# Patient Record
Sex: FEMALE | Race: BLACK OR AFRICAN AMERICAN | Employment: FULL TIME | URBAN - METROPOLITAN AREA
[De-identification: names, ages, dates, MRNs, and addresses within clinical notes are randomized per-mention and may not be internally consistent; named-entity substitution may affect disease eponyms.]

---

## 2018-09-29 ENCOUNTER — HOSPITAL ENCOUNTER (EMERGENCY)
Age: 43
Discharge: HOME | End: 2018-09-29
Attending: EMERGENCY MEDICINE | Admitting: EMERGENCY MEDICINE
Payer: COMMERCIAL

## 2018-09-29 ENCOUNTER — APPOINTMENT (OUTPATIENT)
Dept: GENERAL RADIOLOGY | Age: 43
End: 2018-09-29
Attending: PHYSICIAN ASSISTANT
Payer: COMMERCIAL

## 2018-09-29 VITALS
HEIGHT: 67 IN | OXYGEN SATURATION: 99 % | HEART RATE: 94 BPM | TEMPERATURE: 97.8 F | WEIGHT: 250 LBS | BODY MASS INDEX: 39.24 KG/M2 | RESPIRATION RATE: 18 BRPM | SYSTOLIC BLOOD PRESSURE: 128 MMHG | DIASTOLIC BLOOD PRESSURE: 77 MMHG

## 2018-09-29 DIAGNOSIS — M25.511 PAIN IN JOINT OF RIGHT SHOULDER: Primary | ICD-10-CM

## 2018-09-29 PROCEDURE — 74011250637 HC RX REV CODE- 250/637: Performed by: PHYSICIAN ASSISTANT

## 2018-09-29 PROCEDURE — 99283 EMERGENCY DEPT VISIT LOW MDM: CPT

## 2018-09-29 PROCEDURE — 74011636637 HC RX REV CODE- 636/637: Performed by: PHYSICIAN ASSISTANT

## 2018-09-29 PROCEDURE — 73030 X-RAY EXAM OF SHOULDER: CPT

## 2018-09-29 PROCEDURE — 72050 X-RAY EXAM NECK SPINE 4/5VWS: CPT

## 2018-09-29 RX ORDER — NAPROXEN 500 MG/1
500 TABLET ORAL 2 TIMES DAILY WITH MEALS
Qty: 20 TAB | Refills: 0 | Status: SHIPPED | OUTPATIENT
Start: 2018-09-29 | End: 2019-10-13

## 2018-09-29 RX ORDER — HYDROCODONE BITARTRATE AND ACETAMINOPHEN 5; 325 MG/1; MG/1
1 TABLET ORAL
Qty: 6 TAB | Refills: 0 | Status: SHIPPED | OUTPATIENT
Start: 2018-09-29 | End: 2019-10-13

## 2018-09-29 RX ORDER — PREDNISONE 10 MG/1
TABLET ORAL
Qty: 48 TAB | Refills: 0 | Status: SHIPPED | OUTPATIENT
Start: 2018-09-29 | End: 2019-10-13

## 2018-09-29 RX ORDER — NAPROXEN 250 MG/1
500 TABLET ORAL
Status: COMPLETED | OUTPATIENT
Start: 2018-09-29 | End: 2018-09-29

## 2018-09-29 RX ORDER — LEVOTHYROXINE SODIUM 50 UG/1
TABLET ORAL
COMMUNITY
End: 2019-10-13

## 2018-09-29 RX ORDER — PREDNISONE 20 MG/1
60 TABLET ORAL
Status: COMPLETED | OUTPATIENT
Start: 2018-09-29 | End: 2018-09-29

## 2018-09-29 RX ADMIN — PREDNISONE 60 MG: 20 TABLET ORAL at 18:15

## 2018-09-29 RX ADMIN — NAPROXEN 500 MG: 250 TABLET ORAL at 18:15

## 2018-09-29 NOTE — LETTER
[facility] EMERGENCY DEPT 
[address] [facility] [address] [address] 
[phone] Work/School Note Date: 9/29/2018 To Whom It May concern: 
 
Monica Gastelum was seen and treated today in the emergency room by the following provider(s): 
Attending Provider: Mariano Alicia MD 
Physician Assistant: Jake Carcamo. Monica Gastelum may return to work on 10/2/2018. Sincerely, JANESSA Carcamo

## 2018-09-29 NOTE — ED PROVIDER NOTES
EMERGENCY DEPARTMENT HISTORY AND PHYSICAL EXAM 
 
 
Date: 9/29/2018 Patient Name: Billy Gómez History of Presenting Illness Chief Complaint Patient presents with  Shoulder Pain Pt c/o right shoulder pain that was intermitten annd now constant History Provided By: Patient HPI: Billy Gómez, 41 y.o. female with PMHx significant for asthma, presents ambulatory to the ED with cc of right shoulder pain x months, worsening over the past week. Denies trauma/injury. Reports pain worse with movement and worse at night. Rates pain 10/10. Describes pain as intermittent aching. Has taken ibuprofen without relief. Reports intermittent tingling. Denies SOB, chest pain. Denies previous shoulder injury. There are no other complaints, changes, or physical findings at this time. PCP: Bharti Salcedo MD 
 
Current Outpatient Prescriptions Medication Sig Dispense Refill  levothyroxine (SYNTHROID) 50 mcg tablet Take  by mouth Daily (before breakfast).  naproxen (NAPROSYN) 500 mg tablet Take 1 Tab by mouth two (2) times daily (with meals). 20 Tab 0  predniSONE (STERAPRED DS) 10 mg dose pack Take as instructed on pack 48 Tab 0  
 HYDROcodone-acetaminophen (NORCO) 5-325 mg per tablet Take 1 Tab by mouth every six (6) hours as needed for Pain. Max Daily Amount: 4 Tabs. 6 Tab 0  
 hydrOXYzine (VISTARIL) 25 mg capsule Take 1 Cap by mouth three (3) times daily as needed for Itching. 10 Cap 0  
 SUMAtriptan (IMITREX) 25 mg tablet Take  by mouth once as needed for Migraine.  butalbital-acetaminophen-caffeine (FIORICET) -40 mg per tablet Take 1 Tab by mouth. Past History Past Medical History: 
Past Medical History:  
Diagnosis Date  Asthma   
 albuterol inhaler prn  Asthma  HX OTHER MEDICAL   
 vag delivery x2 Past Surgical History: 
Past Surgical History:  
Procedure Laterality Date  HX OTHER SURGICAL    
 umbilical hernia repair 1930 Family History: 
Family History Problem Relation Age of Onset  Cancer Mother  Hypertension Mother  Cancer Sister  Cancer Maternal Aunt  Arthritis-rheumatoid Maternal Grandmother  Stroke Maternal Grandmother  Diabetes Maternal Grandfather Social History: 
Social History Substance Use Topics  Smoking status: Never Smoker  Smokeless tobacco: Never Used  Alcohol use No  
 
 
Allergies: Allergies Allergen Reactions  Aspirin Unknown (comments) States that her mother said she was allergic, had as her child  Lemon Unable to [employer]  Pcn [Penicillins] Unknown (comments) Pt states her mother said she was allergic, had as a child Review of Systems Review of Systems Constitutional: Negative for chills and fever. Respiratory: Negative for shortness of breath. Cardiovascular: Negative for chest pain. Gastrointestinal: Negative for abdominal pain, nausea and vomiting. Genitourinary: Negative for flank pain. Musculoskeletal: Positive for arthralgias. Negative for back pain, myalgias and neck pain. Right shoulder pain Skin: Negative for color change, pallor, rash and wound. Neurological: Negative for dizziness, weakness and light-headedness. All other systems reviewed and are negative. Physical Exam  
Physical Exam  
Constitutional: She is oriented to person, place, and time. She appears well-developed and well-nourished. No distress. HENT:  
Head: Normocephalic and atraumatic. Eyes: Conjunctivae are normal.  
Cardiovascular: Normal rate, regular rhythm and normal heart sounds. Pulmonary/Chest: Effort normal and breath sounds normal. No respiratory distress. Abdominal: Soft. Bowel sounds are normal. She exhibits no distension. Musculoskeletal:  
     Right shoulder: She exhibits tenderness, bony tenderness and pain.  She exhibits normal range of motion (pain worse wtih overhead movement, abduction, internal rotation) and no spasm. Right elbow: Normal. 
     Cervical back: Normal.  
Neurological: She is alert and oriented to person, place, and time. Skin: Skin is warm. No rash noted. Psychiatric: She has a normal mood and affect. Her behavior is normal.  
Nursing note and vitals reviewed. Diagnostic Study Results Labs - No results found for this or any previous visit (from the past 12 hour(s)). Radiologic Studies -  
XR SPINE CERV 4 OR 5 V Final Result XR SHOULDER RT AP/LAT MIN 2 V Final Result CT Results  (Last 48 hours) None CXR Results  (Last 48 hours) None Medical Decision Making I am the first provider for this patient. I reviewed the vital signs, available nursing notes, past medical history, past surgical history, family history and social history. Vital Signs-Reviewed the patient's vital signs. Patient Vitals for the past 12 hrs: 
 Temp Pulse Resp BP SpO2  
09/29/18 1723 97.8 °F (36.6 °C) 94 18 128/77 99 % Records Reviewed: Nursing Notes and Old Medical Records Provider Notes (Medical Decision Making): DDX: RTC tear, frozen shoulder, shoulder sprain vs bursitis, cervical radiculopathy Low level of concern for cardiac origin. Pain reproducible on exam and pt splinting arm upon entrance into room. ED Course:  
Initial assessment performed. The patients presenting problems have been discussed, and they are in agreement with the care plan formulated and outlined with them. I have encouraged them to ask questions as they arise throughout their visit. Disposition: 
6:58 PM 
Discussed imaging results with pt along with dx and treatment plan. Discussed importance of ortho follow up. All questions answered. Pt voiced they understood. Return if sx worsen. PLAN: 
1. Current Discharge Medication List  
  
START taking these medications Details naproxen (NAPROSYN) 500 mg tablet Take 1 Tab by mouth two (2) times daily (with meals). Qty: 20 Tab, Refills: 0 Associated Diagnoses: Pain in joint of right shoulder HYDROcodone-acetaminophen (NORCO) 5-325 mg per tablet Take 1 Tab by mouth every six (6) hours as needed for Pain. Max Daily Amount: 4 Tabs. Qty: 6 Tab, Refills: 0 Associated Diagnoses: Pain in joint of right shoulder CONTINUE these medications which have CHANGED Details  
predniSONE (STERAPRED DS) 10 mg dose pack Take as instructed on pack Qty: 48 Tab, Refills: 0 Associated Diagnoses: Pain in joint of right shoulder 2. Follow-up Information Follow up With Details Comments Contact Info [facility] Schedule an appointment as soon as possible for a visit in 1 day  [facility] [address] [facility] 
[phone] Return to ED if worse Diagnosis Clinical Impression: 1. Pain in joint of right shoulder

## 2018-09-29 NOTE — DISCHARGE INSTRUCTIONS
Rotator Cuff Injury: Care Instructions  Your Care Instructions  The rotator cuff is a group of tendons and muscles around the shoulder that keeps the upper arm bone in place. It keeps the shoulder joint stable and allows you to raise and rotate your arm. Damage to the rotator cuff can be caused by overuse, a fall, or a direct blow to the shoulder area, which can tear the tendons. Over time, everyday wear can damage the tendons and make injury more likely. Treatment can depend upon the amount of damage to the tendons. In a younger person, surgery may be the first choice. But if you are older than 25, you likely have some wear on your rotator cuff. Surgery may not be the most effective treatment. Your doctor may have you try physical therapy and exercise first.  Follow-up care is a key part of your treatment and safety. Be sure to make and go to all appointments, and call your doctor if you are having problems. It's also a good idea to know your test results and keep a list of the medicines you take. How can you care for yourself at home? · Rest your shoulder as much as you can. If your doctor put your arm in a sling or shoulder immobilizer, wear it as directed. Do not take it off before your doctor tells you to. If it is too tight, loosen it. · Be safe with medicines. Read and follow all instructions on the label. ¨ If the doctor gave you a prescription medicine for pain, take it as prescribed. ¨ If you are not taking a prescription pain medicine, ask your doctor if you can take an over-the-counter medicine. · Put ice or a cold pack on your shoulder for 10 to 20 minutes at a time. Try to do this every 1 to 2 hours for the next 3 days (when you are awake). Put a thin cloth between the ice pack and your skin. · After 3 days, put a warm, wet towel on your shoulder. This is to relax the muscles and help blood flow.   · While holding a warm, wet towel on your shoulder, lean forward so your arm hangs freely, and gently swing your arm back and forth like a pendulum. You also can do this standing under a warm shower. · Do not do anything that makes your pain worse. · Follow your doctor's advice about whether you need physical therapy. When should you call for help? Call your doctor now or seek immediate medical care if:    · You have severe pain.     · You cannot move your shoulder or arm.     · You have tingling or numbness in your arm or hand.     · Your arm or hand is cool or pale.    Watch closely for changes in your health, and be sure to contact your doctor if:    · Your pain gets worse.     · You have new or worse swelling in your arm or hand.     · You do not get better as expected. Where can you learn more? Go to http://sarah-eli.info/. Enter [phone] in the search box to learn more about \"Rotator Cuff Injury: Care Instructions. \"  Current as of: November 29, 2017  Content Version: 11.7  © 3991-3177 [employer], [employer] Care instructions adapted under license by [employer] (which disclaims liability or warranty for this information). If you have questions about a medical condition or this instruction, always ask your healthcare professional. [place] [address] any warranty or liability for your use of this information.

## 2018-09-29 NOTE — ED NOTES
Emergency Department Nursing Plan of Care The Nursing Plan of Care is developed from the Nursing assessment and Emergency Department Attending provider initial evaluation. The plan of care may be reviewed in the ED Provider note. The Plan of Care was developed with the following considerations:  
Patient / Family readiness to learn indicated by:verbalized understanding Persons(s) to be included in education: patient Barriers to Learning/Limitations:No 
 
Signed Kaylyn Pineda RN   
9/29/2018   6:43 PM

## 2019-10-13 ENCOUNTER — HOSPITAL ENCOUNTER (EMERGENCY)
Age: 44
Discharge: HOME | End: 2019-10-13
Attending: EMERGENCY MEDICINE
Payer: COMMERCIAL

## 2019-10-13 VITALS
SYSTOLIC BLOOD PRESSURE: 125 MMHG | HEIGHT: 68 IN | OXYGEN SATURATION: 92 % | HEART RATE: 75 BPM | BODY MASS INDEX: 38.49 KG/M2 | DIASTOLIC BLOOD PRESSURE: 78 MMHG | TEMPERATURE: 98.1 F | WEIGHT: 254 LBS | RESPIRATION RATE: 15 BRPM

## 2019-10-13 DIAGNOSIS — R51.9 ACUTE NONINTRACTABLE HEADACHE, UNSPECIFIED HEADACHE TYPE: Primary | ICD-10-CM

## 2019-10-13 DIAGNOSIS — M25.562 ACUTE PAIN OF LEFT KNEE: ICD-10-CM

## 2019-10-13 PROCEDURE — 74011250636 HC RX REV CODE- 250/636: Performed by: PHYSICIAN ASSISTANT

## 2019-10-13 PROCEDURE — 74011250637 HC RX REV CODE- 250/637: Performed by: PHYSICIAN ASSISTANT

## 2019-10-13 PROCEDURE — 96372 THER/PROPH/DIAG INJ SC/IM: CPT

## 2019-10-13 PROCEDURE — 99283 EMERGENCY DEPT VISIT LOW MDM: CPT

## 2019-10-13 RX ORDER — BUTALBITAL, ACETAMINOPHEN AND CAFFEINE 50; 325; 40 MG/1; MG/1; MG/1
1 TABLET ORAL
Status: COMPLETED | OUTPATIENT
Start: 2019-10-13 | End: 2019-10-13

## 2019-10-13 RX ORDER — KETOROLAC TROMETHAMINE 30 MG/ML
30 INJECTION, SOLUTION INTRAMUSCULAR; INTRAVENOUS
Status: COMPLETED | OUTPATIENT
Start: 2019-10-13 | End: 2019-10-13

## 2019-10-13 RX ORDER — DIPHENHYDRAMINE HCL 25 MG
50 CAPSULE ORAL
Qty: 20 CAP | Refills: 0 | Status: SHIPPED | OUTPATIENT
Start: 2019-10-13 | End: 2019-10-23

## 2019-10-13 RX ORDER — IBUPROFEN 800 MG/1
800 TABLET ORAL
Qty: 20 TAB | Refills: 0 | Status: SHIPPED | OUTPATIENT
Start: 2019-10-13 | End: 2019-10-20

## 2019-10-13 RX ORDER — DIPHENHYDRAMINE HCL 25 MG
25 CAPSULE ORAL
Status: COMPLETED | OUTPATIENT
Start: 2019-10-13 | End: 2019-10-13

## 2019-10-13 RX ADMIN — DIPHENHYDRAMINE HYDROCHLORIDE 25 MG: 25 CAPSULE ORAL at 09:11

## 2019-10-13 RX ADMIN — BUTALBITAL, ACETAMINOPHEN, AND CAFFEINE 1 TABLET: 50; 325; 40 TABLET, COATED ORAL at 09:11

## 2019-10-13 RX ADMIN — KETOROLAC TROMETHAMINE 30 MG: 30 INJECTION, SOLUTION INTRAMUSCULAR; INTRAVENOUS at 09:11

## 2019-10-13 NOTE — ED NOTES
Emergency Department Nursing Plan of Care       The Nursing Plan of Care is developed from the Nursing assessment and Emergency Department Attending provider initial evaluation. The plan of care may be reviewed in the ED Provider note. The Plan of Care was developed with the following considerations:   Patient / Family readiness to learn indicated by:verbalized understanding  Persons(s) to be included in education: patient  Barriers to Learning/Limitations:No    Signed     Wilfredo Feldman    10/13/2019   9:17 AM    Patient is alert and oriented x 4 and in no acute distress at this time. Respirations are at a regular rate, depth, and pattern. Patient updated on plan of care and has no questions or concerns at this time. Call bell within reach. Will continue to monitor. Please reference nursing assessment.

## 2019-10-13 NOTE — ED PROVIDER NOTES
EMERGENCY DEPARTMENT HISTORY AND PHYSICAL EXAM      Date: 10/13/2019  Patient Name: Shu Rivas    History of Presenting Illness     Chief Complaint   Patient presents with    Headache     since Thursday    Knee Pain     History Provided By: Patient    HPI: Shu Rivsa, 45 y.o. female with asthma, obesity who presents ambulatory to the ED with cc of subacute moderate aching generalized bilateral headache X 4 days. Patient endorses history of migraine headaches. Denies thunderclap headache or worst headache of her life. Symptoms consistent with migraine headaches in the past.  Endorses mild photophobia. Denies fever, chills, nausea, vomiting, lightheadedness, dizziness ear pain, sore throat, neck pain or stiffness, chest pain, shortness of breath, palpitations. Fioricet without relief. Denies taking medication prior to arrival today. Patient additionally endorses left knee pain X 1 week secondary to taking a workout class. History of posterior knee pain due to hyperextension and possible Baker's cyst diagnosis years ago. Denies any direct trauma or injury. Pain exacerbated with standing up for long periods of time. Denies numbness, tingling, limited range of motion, lower leg swelling, redness, rash, focal weakness. No medications for symptoms today. PCP: Olga Ayala MD    There are no other complaints, changes, or physical findings at this time. No current facility-administered medications on file prior to encounter. Current Outpatient Medications on File Prior to Encounter   Medication Sig Dispense Refill    butalbital-acetaminophen-caffeine (FIORICET) -40 mg per tablet Take 1 Tab by mouth.  [DISCONTINUED] levothyroxine (SYNTHROID) 50 mcg tablet Take  by mouth Daily (before breakfast).  [DISCONTINUED] naproxen (NAPROSYN) 500 mg tablet Take 1 Tab by mouth two (2) times daily (with meals).  20 Tab 0    [DISCONTINUED] predniSONE (STERAPRED DS) 10 mg dose pack Take as instructed on pack 48 Tab 0    [DISCONTINUED] HYDROcodone-acetaminophen (NORCO) 5-325 mg per tablet Take 1 Tab by mouth every six (6) hours as needed for Pain. Max Daily Amount: 4 Tabs. 6 Tab 0    [DISCONTINUED] hydrOXYzine (VISTARIL) 25 mg capsule Take 1 Cap by mouth three (3) times daily as needed for Itching. 10 Cap 0    [DISCONTINUED] SUMAtriptan (IMITREX) 25 mg tablet Take  by mouth once as needed for Migraine. Past History     Past Medical History:  Past Medical History:   Diagnosis Date    Asthma     albuterol inhaler prn     Asthma     HX OTHER MEDICAL     vag delivery x2     Past Surgical History:  Past Surgical History:   Procedure Laterality Date    HX OTHER SURGICAL      umbilical hernia repair 3560     Family History:  Family History   Problem Relation Age of Onset   24 [facility] Cancer Mother     Hypertension Mother     Cancer Sister     Cancer Maternal Aunt     Arthritis-rheumatoid Maternal Grandmother     Stroke Maternal Grandmother     Diabetes Maternal Grandfather      Social History:  Social History     Tobacco Use    Smoking status: Never Smoker    Smokeless tobacco: Never Used   Substance Use Topics    Alcohol use: No    Drug use: No     Allergies: Allergies   Allergen Reactions    Aspirin Unknown (comments)     States that her mother said she was allergic, had as her child    Lemon Unable to Obtain    Pcn [Penicillins] Unknown (comments)     Pt states her mother said she was allergic, had as a child     Review of Systems   Review of Systems   Constitutional: Negative for activity change, chills, diaphoresis, fatigue and fever. HENT: Negative for ear discharge, ear pain, hearing loss, nosebleeds, rhinorrhea and voice change. Eyes: Negative for photophobia, pain and visual disturbance. Respiratory: Negative for apnea, cough and shortness of breath. Cardiovascular: Negative for chest pain and leg swelling.    Gastrointestinal: Negative for abdominal pain, diarrhea, nausea and vomiting. Genitourinary: Negative for difficulty urinating, dysuria and hematuria. Musculoskeletal: Positive for arthralgias. Negative for back pain, gait problem, joint swelling, myalgias, neck pain and neck stiffness. Skin: Negative. Negative for wound. Neurological: Positive for headaches. Negative for dizziness, seizures, syncope, weakness, light-headedness and numbness. Psychiatric/Behavioral: Negative. Physical Exam   Physical Exam   Constitutional: She is oriented to person, place, and time. She appears well-developed and well-nourished. No distress. HENT:   Head: Normocephalic and atraumatic. Right Ear: External ear normal.   Left Ear: External ear normal.   Nose: Nose normal.   Mouth/Throat: Oropharynx is clear and moist. No oropharyngeal exudate. Eyes: Pupils are equal, round, and reactive to light. Conjunctivae and EOM are normal.   Neck: Normal range of motion. Neck supple. Cardiovascular: Normal rate, regular rhythm, normal heart sounds and intact distal pulses. Pulses:       Posterior tibial pulses are 2+ on the right side, and 2+ on the left side. Pulmonary/Chest: Effort normal and breath sounds normal.   Abdominal: Soft. Bowel sounds are normal.   Musculoskeletal: Normal range of motion. Left hip: Normal.        Right knee: Normal.        Left knee: She exhibits normal range of motion, no swelling, no effusion, no ecchymosis, no deformity, no laceration, no erythema, normal alignment, no LCL laxity, normal patellar mobility, no bony tenderness, normal meniscus and no MCL laxity. Tenderness (popliteal fossa (mild)) found. Left ankle: Normal.   Mild telangiectasia to BLE. Neurological: She is alert and oriented to person, place, and time. She has normal strength and normal reflexes. She is not disoriented. No cranial nerve deficit or sensory deficit. GCS eye subscore is 4. GCS verbal subscore is 5. GCS motor subscore is 6.    Skin: Skin is warm and dry. No rash noted. She is not diaphoretic. Psychiatric: She has a normal mood and affect. Her behavior is normal. Judgment and thought content normal.   Nursing note and vitals reviewed. Diagnostic Study Results   Labs -   No results found for this or any previous visit (from the past 12 hour(s)). Radiologic Studies -   No orders to display     No results found. Medical Decision Making   I am the first provider for this patient. I reviewed the vital signs, available nursing notes, past medical history, past surgical history, family history and social history. Vital Signs-Reviewed the patient's vital signs. Patient Vitals for the past 12 hrs:   Temp Pulse Resp BP SpO2   10/13/19 0818 98.1 °F (36.7 °C) 75 15 125/78 92 %     Pulse Oximetry Analysis - 92% on RA    Records Reviewed: Nursing Notes, Old Medical Records, Previous Radiology Studies and Previous Laboratory Studies    Provider Notes (Medical Decision Making):   Patient presents with headache. DDx: migraine, cluster HA, tension HA, dehydration/lack of proper hydration, lack of proper sleep, stress. Less likely pseudotumor cerebri, SAH, ICH, cerebral dural venous thrombosis, or meningitis given the course, story and physical exam.  Analgesics and fluids ordered. Counseled on the importance of good hydration and 3 meals a day as well as good sleep hygiene. Patient presents with lower leg and posterior knee pain. DDx include tendonitis, bursitis, sprain, strain, Lymphedema, DVT, cellulitis, leg cramping 2/2 low K, CHF, ARF, liver failure, PAD. Will get labs and Doppler Leg PRN. ED Course:   Initial assessment performed. The patients presenting problems have been discussed, and they are in agreement with the care plan formulated and outlined with them. I have encouraged them to ask questions as they arise throughout their visit.     ED Course as of Oct 13 4091   [facility] Oct 13, 2019   6942 Patient does not wish to stay for duplex ultrasound to rule out DVT nor does she wish to have any lab work (dimer) or other work-up. Requesting work note and prescription medications for headache at home.    [SM]      ED Course User Index  [SM] Fausto Matos PA-C       Progress Note:   Updated pt on all returned results and findings. Discussed the importance of proper follow up as referred below along with return precautions. Pt in agreement with the care plan and expresses agreement with and understanding of all items discussed. Disposition:  9:41 AM  I have discussed with patient their diagnosis, treatment, and follow up plan. The patient agrees to follow up as outlined in discharge paperwork and also to return to the ED with any worsening. Bethany Humphrey PA-C      PLAN:  1. Current Discharge Medication List      START taking these medications    Details   ibuprofen (MOTRIN) 800 mg tablet Take 1 Tab by mouth every six (6) hours as needed for Pain for up to 7 days. Qty: 20 Tab, Refills: 0      diphenhydrAMINE (BENADRYL) 25 mg capsule Take 2 Caps by mouth every six (6) hours as needed (headache) for up to 10 days. Qty: 20 Cap, Refills: 0         CONTINUE these medications which have NOT CHANGED    Details   butalbital-acetaminophen-caffeine (FIORICET) -40 mg per tablet Take 1 Tab by mouth. 2.   Follow-up Information     Follow up With Specialties Details Why Safia Marquez MD Internal Medicine Schedule an appointment as soon as possible for a visit in 2 days As needed [address] [place] [address]  [phone]          Return to ED if worse     Diagnosis     Clinical Impression:   1. Acute nonintractable headache, unspecified headache type    2. Acute pain of left knee            Please note that this dictation was completed with Dragon, computer voice recognition software.   Quite often unanticipated grammatical, syntax, homophones, and other interpretive errors are inadvertently transcribed by the computer software. Please disregard these errors. Additionally, please excuse any errors that have escaped final proofreading.

## 2019-10-13 NOTE — ED NOTES
Discharge instructions were given to the patient by NAHED Jarrett RN. .     The patient left the Emergency Department ambulatory, alert and oriented and in no acute distress with 2 prescription(s). The patient was encouraged to call or return to the ED for worsening symptoms or problems and was encouraged to schedule a follow up appointment for continuing care. Patient leaving ED accompanied by self. The patient verbalized understanding of discharge instructions and prescriptions, all questions were answered. The patient has no further concerns at this time. Patient declined wheelchair transfer upon ED discharge.

## 2019-10-13 NOTE — DISCHARGE INSTRUCTIONS
Patient Education        Headache: Care Instructions  Your Care Instructions    Headaches have many possible causes. Most headaches aren't a sign of a more serious problem, and they will get better on their own. Home treatment may help you feel better faster. The doctor has checked you carefully, but problems can develop later. If you notice any problems or new symptoms, get medical treatment right away. Follow-up care is a key part of your treatment and safety. Be sure to make and go to all appointments, and call your doctor if you are having problems. It's also a good idea to know your test results and keep a list of the medicines you take. How can you care for yourself at home? · Do not drive if you have taken a prescription pain medicine. · Rest in a quiet, dark room until your headache is gone. Close your eyes and try to relax or go to sleep. Don't watch TV or read. · Put a cold, moist cloth or cold pack on the painful area for 10 to 20 minutes at a time. Put a thin cloth between the cold pack and your skin. · Use a warm, moist towel or a heating pad set on low to relax tight shoulder and neck muscles. · Have someone gently massage your neck and shoulders. · Take pain medicines exactly as directed. ? If the doctor gave you a prescription medicine for pain, take it as prescribed. ? If you are not taking a prescription pain medicine, ask your doctor if you can take an over-the-counter medicine. · Be careful not to take pain medicine more often than the instructions allow, because you may get worse or more frequent headaches when the medicine wears off. · Do not ignore new symptoms that occur with a headache, such as a fever, weakness or numbness, vision changes, or confusion. These may be signs of a more serious problem. To prevent headaches  · Keep a headache diary so you can figure out what triggers your headaches. Avoiding triggers may help you prevent headaches.  Record when each headache began, how long it lasted, and what the pain was like (throbbing, aching, stabbing, or dull). Write down any other symptoms you had with the headache, such as nausea, flashing lights or dark spots, or sensitivity to bright light or loud noise. Note if the headache occurred near your period. List anything that might have triggered the headache, such as certain foods (chocolate, cheese, wine) or odors, smoke, bright light, stress, or lack of sleep. · Find healthy ways to deal with stress. Headaches are most common during or right after stressful times. Take time to relax before and after you do something that has caused a headache in the past.  · Try to keep your muscles relaxed by keeping good posture. Check your jaw, face, neck, and shoulder muscles for tension, and try relaxing them. When sitting at a desk, change positions often, and stretch for 30 seconds each hour. · Get plenty of sleep and exercise. · Eat regularly and well. Long periods without food can trigger a headache. · Treat yourself to a massage. Some people find that regular massages are very helpful in relieving tension. · Limit caffeine by not drinking too much coffee, tea, or soda. But don't quit caffeine suddenly, because that can also give you headaches. · Reduce eyestrain from computers by blinking frequently and looking away from the computer screen every so often. Make sure you have proper eyewear and that your monitor is set up properly, about an arm's length away. · Seek help if you have depression or anxiety. Your headaches may be linked to these conditions. Treatment can both prevent headaches and help with symptoms of anxiety or depression. When should you call for help? Call 911 anytime you think you may need emergency care. For example, call if:    · You have signs of a stroke. These may include:  ? Sudden numbness, paralysis, or weakness in your face, arm, or leg, especially on only one side of your body.   ? Sudden vision changes. ? Sudden trouble speaking. ? Sudden confusion or trouble understanding simple statements. ? Sudden problems with walking or balance. ? A sudden, severe headache that is different from past headaches.    Call your doctor now or seek immediate medical care if:    · You have a new or worse headache.     · Your headache gets much worse. Where can you learn more? Go to http://sarah-eli.info/. Enter M271 in the search box to learn more about \"Headache: Care Instructions. \"  Current as of: March 28, 2019  Content Version: 12.2  © 6502-1706 [employer]. Care instructions adapted under license by [employer] (which disclaims liability or warranty for this information). If you have questions about a medical condition or this instruction, always ask your healthcare professional. [place] [address] any warranty or liability for your use of this information. Patient Education        Knee Pain or Injury: Care Instructions  Your Care Instructions    Injuries are a common cause of knee problems. Sudden (acute) injuries may be caused by a direct blow to the knee. They can also be caused by abnormal twisting, bending, or falling on the knee. Pain, bruising, or swelling may be severe, and may start within minutes of the injury. Overuse is another cause of knee pain. Other causes are climbing stairs, kneeling, and other activities that use the knee. Everyday wear and tear, especially as you get older, also can cause knee pain. Rest, along with home treatment, often relieves pain and allows your knee to heal. If you have a serious knee injury, you may need tests and treatment. Follow-up care is a key part of your treatment and safety. Be sure to make and go to all appointments, and call your doctor if you are having problems. It's also a good idea to know your test results and keep a list of the medicines you take.   How can you care for yourself at home?  · Be safe with medicines. Read and follow all instructions on the label. ? If the doctor gave you a prescription medicine for pain, take it as prescribed. ? If you are not taking a prescription pain medicine, ask your doctor if you can take an over-the-counter medicine. · Rest and protect your knee. Take a break from any activity that may cause pain. · Put ice or a cold pack on your knee for 10 to 20 minutes at a time. Put a thin cloth between the ice and your skin. · Prop up a sore knee on a pillow when you ice it or anytime you sit or lie down for the next 3 days. Try to keep it above the level of your heart. This will help reduce swelling. · If your knee is not swollen, you can put moist heat, a heating pad, or a warm cloth on your knee. · If your doctor recommends an elastic bandage, sleeve, or other type of support for your knee, wear it as directed. · Follow your doctor's instructions about how much weight you can put on your leg. Use a cane, crutches, or a walker as instructed. · Follow your doctor's instructions about activity during your healing process. If you can do mild exercise, slowly increase your activity. · Reach and stay at a healthy weight. Extra weight can strain the joints, especially the knees and hips, and make the pain worse. Losing even a few pounds may help. When should you call for help? Call 911 anytime you think you may need emergency care. For example, call if:    · You have symptoms of a blood clot in your lung (called a pulmonary embolism). These may include:  ? Sudden chest pain. ? Trouble breathing. ?  Coughing up blood.    Call your doctor now or seek immediate medical care if:    · You have severe or increasing pain.     · Your leg or foot turns cold or changes color.     · You cannot stand or put weight on your knee.     · Your knee looks twisted or bent out of shape.     · You cannot move your knee.     · You have signs of infection, such as:  ? Increased pain, swelling, warmth, or redness. ? Red streaks leading from the knee. ? Pus draining from a place on your knee. ? A fever.     · You have signs of a blood clot in your leg (called a deep vein thrombosis), such as:  ? Pain in your calf, back of the knee, thigh, or groin. ? Redness and swelling in your leg or groin.    Watch closely for changes in your health, and be sure to contact your doctor if:    · You have tingling, weakness, or numbness in your knee.     · You have any new symptoms, such as swelling.     · You have bruises from a knee injury that last longer than 2 weeks.     · You do not get better as expected. Where can you learn more? Go to http://sarah-eli.info/. Enter K195 in the search box to learn more about \"Knee Pain or Injury: Care Instructions. \"  Current as of: June 26, 2019  Content Version: 12.2  © 5735-2590 [employer]. Care instructions adapted under license by [employer] (which disclaims liability or warranty for this information). If you have questions about a medical condition or this instruction, always ask your healthcare professional. [address] any warranty or liability for your use of this information.

## 2019-10-13 NOTE — LETTER
[facility] EMERGENCY DEPT 
[address] [id number] 
[phone] Work/School Note Date: 10/13/2019 To Whom It May concern: 
 
Dolores Montero was seen and treated today in the emergency room by the following provider(s): 
Attending Provider: Benito Montes MD 
Physician Assistant: Milton Day PA-C. Dolores Montero may return to work on 10/15/2019. Sincerely, Josiah Coyne PA-C

## 2021-04-19 ENCOUNTER — HOSPITAL ENCOUNTER (EMERGENCY)
Age: 46
Discharge: HOME | End: 2021-04-19
Attending: EMERGENCY MEDICINE
Payer: COMMERCIAL

## 2021-04-19 ENCOUNTER — APPOINTMENT (OUTPATIENT)
Dept: GENERAL RADIOLOGY | Age: 46
End: 2021-04-19
Attending: PHYSICIAN ASSISTANT
Payer: COMMERCIAL

## 2021-04-19 VITALS
HEART RATE: 108 BPM | RESPIRATION RATE: 18 BRPM | HEIGHT: 68 IN | BODY MASS INDEX: 38.65 KG/M2 | WEIGHT: 255 LBS | TEMPERATURE: 99.8 F | OXYGEN SATURATION: 92 % | DIASTOLIC BLOOD PRESSURE: 75 MMHG | SYSTOLIC BLOOD PRESSURE: 122 MMHG

## 2021-04-19 DIAGNOSIS — U07.1 COVID-19: Primary | ICD-10-CM

## 2021-04-19 DIAGNOSIS — N30.01 ACUTE CYSTITIS WITH HEMATURIA: ICD-10-CM

## 2021-04-19 DIAGNOSIS — J18.9 COMMUNITY ACQUIRED PNEUMONIA OF LEFT LOWER LOBE OF LUNG: ICD-10-CM

## 2021-04-19 LAB
ALBUMIN SERPL-MCNC: 3.2 G/DL (ref 3.5–5)
ALBUMIN/GLOB SERPL: 0.6 {RATIO} (ref 1.1–2.2)
ALP SERPL-CCNC: 74 U/L (ref 45–117)
ALT SERPL-CCNC: 22 U/L (ref 12–78)
ANION GAP SERPL CALC-SCNC: 7 MMOL/L (ref 5–15)
APPEARANCE UR: ABNORMAL
AST SERPL-CCNC: 25 U/L (ref 15–37)
BACTERIA URNS QL MICRO: ABNORMAL /HPF
BASOPHILS # BLD: 0 K/UL (ref 0–0.1)
BASOPHILS NFR BLD: 0 % (ref 0–1)
BILIRUB SERPL-MCNC: 0.3 MG/DL (ref 0.2–1)
BILIRUB UR QL: NEGATIVE
BUN SERPL-MCNC: 10 MG/DL (ref 6–20)
BUN/CREAT SERPL: 8 (ref 12–20)
CALCIUM SERPL-MCNC: 8.8 MG/DL (ref 8.5–10.1)
CHLORIDE SERPL-SCNC: 98 MMOL/L (ref 97–108)
CO2 SERPL-SCNC: 30 MMOL/L (ref 21–32)
COLOR UR: ABNORMAL
CREAT SERPL-MCNC: 1.22 MG/DL (ref 0.55–1.02)
DIFFERENTIAL METHOD BLD: NORMAL
EOSINOPHIL # BLD: 0 K/UL (ref 0–0.4)
EOSINOPHIL NFR BLD: 0 % (ref 0–7)
EPITH CASTS URNS QL MICRO: ABNORMAL /LPF
ERYTHROCYTE [DISTWIDTH] IN BLOOD BY AUTOMATED COUNT: 14 % (ref 11.5–14.5)
FLUAV RNA SPEC QL NAA+PROBE: NOT DETECTED
FLUBV RNA SPEC QL NAA+PROBE: NOT DETECTED
GLOBULIN SER CALC-MCNC: 5.2 G/DL (ref 2–4)
GLUCOSE SERPL-MCNC: 97 MG/DL (ref 65–100)
GLUCOSE UR STRIP.AUTO-MCNC: NEGATIVE MG/DL
HCT VFR BLD AUTO: 40.1 % (ref 35–47)
HGB BLD-MCNC: 13.1 G/DL (ref 11.5–16)
HGB UR QL STRIP: ABNORMAL
IMM GRANULOCYTES # BLD AUTO: 0 K/UL (ref 0–0.04)
IMM GRANULOCYTES NFR BLD AUTO: 0 % (ref 0–0.5)
KETONES UR QL STRIP.AUTO: 15 MG/DL
LEUKOCYTE ESTERASE UR QL STRIP.AUTO: ABNORMAL
LYMPHOCYTES # BLD: 0.9 K/UL (ref 0.8–3.5)
LYMPHOCYTES NFR BLD: 20 % (ref 12–49)
MCH RBC QN AUTO: 29.9 PG (ref 26–34)
MCHC RBC AUTO-ENTMCNC: 32.7 G/DL (ref 30–36.5)
MCV RBC AUTO: 91.6 FL (ref 80–99)
MONOCYTES # BLD: 0.3 K/UL (ref 0–1)
MONOCYTES NFR BLD: 6 % (ref 5–13)
NEUTS SEG # BLD: 3.6 K/UL (ref 1.8–8)
NEUTS SEG NFR BLD: 74 % (ref 32–75)
NITRITE UR QL STRIP.AUTO: NEGATIVE
NRBC # BLD: 0 K/UL (ref 0–0.01)
NRBC BLD-RTO: 0 PER 100 WBC
PH UR STRIP: 6 [PH] (ref 5–8)
PLATELET # BLD AUTO: 212 K/UL (ref 150–400)
PMV BLD AUTO: 9.8 FL (ref 8.9–12.9)
POTASSIUM SERPL-SCNC: 3.4 MMOL/L (ref 3.5–5.1)
PROT SERPL-MCNC: 8.4 G/DL (ref 6.4–8.2)
PROT UR STRIP-MCNC: 30 MG/DL
RBC # BLD AUTO: 4.38 M/UL (ref 3.8–5.2)
RBC #/AREA URNS HPF: ABNORMAL /HPF (ref 0–5)
SARS-COV-2, COV2: DETECTED
SODIUM SERPL-SCNC: 135 MMOL/L (ref 136–145)
SP GR UR REFRACTOMETRY: 1.01 (ref 1–1.03)
UA: UC IF INDICATED,UAUC: ABNORMAL
UROBILINOGEN UR QL STRIP.AUTO: 1 EU/DL (ref 0.2–1)
WBC # BLD AUTO: 4.8 K/UL (ref 3.6–11)
WBC URNS QL MICRO: ABNORMAL /HPF (ref 0–4)

## 2021-04-19 PROCEDURE — 96361 HYDRATE IV INFUSION ADD-ON: CPT

## 2021-04-19 PROCEDURE — 80053 COMPREHEN METABOLIC PANEL: CPT

## 2021-04-19 PROCEDURE — 71045 X-RAY EXAM CHEST 1 VIEW: CPT

## 2021-04-19 PROCEDURE — 87636 SARSCOV2 & INF A&B AMP PRB: CPT

## 2021-04-19 PROCEDURE — 74011250637 HC RX REV CODE- 250/637: Performed by: PHYSICIAN ASSISTANT

## 2021-04-19 PROCEDURE — 96374 THER/PROPH/DIAG INJ IV PUSH: CPT

## 2021-04-19 PROCEDURE — 81001 URINALYSIS AUTO W/SCOPE: CPT

## 2021-04-19 PROCEDURE — 36415 COLL VENOUS BLD VENIPUNCTURE: CPT

## 2021-04-19 PROCEDURE — 99284 EMERGENCY DEPT VISIT MOD MDM: CPT

## 2021-04-19 PROCEDURE — 74011250636 HC RX REV CODE- 250/636: Performed by: PHYSICIAN ASSISTANT

## 2021-04-19 PROCEDURE — 85025 COMPLETE CBC W/AUTO DIFF WBC: CPT

## 2021-04-19 PROCEDURE — 87086 URINE CULTURE/COLONY COUNT: CPT

## 2021-04-19 RX ORDER — ACETAMINOPHEN 500 MG
1000 TABLET ORAL
Status: COMPLETED | OUTPATIENT
Start: 2021-04-19 | End: 2021-04-19

## 2021-04-19 RX ORDER — KETOROLAC TROMETHAMINE 30 MG/ML
30 INJECTION, SOLUTION INTRAMUSCULAR; INTRAVENOUS ONCE
Status: DISCONTINUED | OUTPATIENT
Start: 2021-04-19 | End: 2021-04-19

## 2021-04-19 RX ORDER — AZITHROMYCIN 250 MG/1
250 TABLET, FILM COATED ORAL DAILY
Qty: 4 TAB | Refills: 0 | Status: SHIPPED | OUTPATIENT
Start: 2021-04-20 | End: 2021-04-24

## 2021-04-19 RX ORDER — AZITHROMYCIN 500 MG/1
500 TABLET, FILM COATED ORAL
Status: COMPLETED | OUTPATIENT
Start: 2021-04-19 | End: 2021-04-19

## 2021-04-19 RX ORDER — KETOROLAC TROMETHAMINE 30 MG/ML
30 INJECTION, SOLUTION INTRAMUSCULAR; INTRAVENOUS
Status: COMPLETED | OUTPATIENT
Start: 2021-04-19 | End: 2021-04-19

## 2021-04-19 RX ADMIN — SODIUM CHLORIDE 1000 ML: 9 INJECTION, SOLUTION INTRAVENOUS at 14:01

## 2021-04-19 RX ADMIN — AZITHROMYCIN MONOHYDRATE 500 MG: 500 TABLET ORAL at 14:01

## 2021-04-19 RX ADMIN — ACETAMINOPHEN 1000 MG: 500 TABLET, FILM COATED ORAL at 13:06

## 2021-04-19 RX ADMIN — KETOROLAC TROMETHAMINE 30 MG: 30 INJECTION, SOLUTION INTRAMUSCULAR; INTRAVENOUS at 13:10

## 2021-04-19 RX ADMIN — SODIUM CHLORIDE 1000 ML: 9 INJECTION, SOLUTION INTRAVENOUS at 13:05

## 2021-04-19 NOTE — LETTER
[address] EMERGENCY DEPT 
[address] [place] [id number] 
[phone] Work/School Note Date: 4/19/2021 To Whom It May Concern: 
 
 
 
Gene Waters received a positive COVID 19 test result at the [facility] on 4/19/21. A positive  test means the patient was infected with the COVID virus at the time of testing. Patient was advised on CDC guidelines for self isolation and social distancing. Pt may return to work on 4/29/21 Sincerely, Erlin Sharma PA-C

## 2021-04-19 NOTE — ED PROVIDER NOTES
EMERGENCY DEPARTMENT HISTORY AND PHYSICAL EXAM    Date: 4/19/2021  Patient Name: Robson Minaya    History of Presenting Illness     Chief Complaint   Patient presents with    Fever    Cough         History Provided By: Patient    HPI: Robson Minaya is a 39 y.o. female with a PMH of asthma who presents with fatigue, cough, feeling dehydrated, dizzy and nasal congestion. Patient states she was seen last Thursday by PCP for sinus infection and started on Bactrim and Singulair. Patient states she is unsure if the antibiotics started giving her cough but she has felt very fatigued and wanting to sleep all day. Patient states she wants to eat but does not want the food after she starts eating. Patient denies any nausea, vomiting, diarrhea, abdominal pain or known fevers until arrival today. Patient states she has been drinking a lot of water but still feels dehydrated. Patient rates generalized pain 7 out of 10. There are no alleviating or exacerbating factors reported. Patient does report getting Covid tested several weeks ago but was asymptomatic at the time and that was due to exposure to someone who tested positive. PCP: Jonatan Rust MD    Current Outpatient Medications   Medication Sig Dispense Refill    [START ON 4/20/2021] azithromycin (ZITHROMAX) 250 mg tablet Take 1 Tab by mouth daily for 4 days. 4 Tab 0    butalbital-acetaminophen-caffeine (FIORICET) -40 mg per tablet Take 1 Tab by mouth.          Past History     Past Medical History:  Past Medical History:   Diagnosis Date    Asthma     albuterol inhaler prn     Asthma     HX OTHER MEDICAL     vag delivery x2       Past Surgical History:  Past Surgical History:   Procedure Laterality Date    HX OTHER SURGICAL      umbilical hernia repair 8960       Family History:  Family History   Problem Relation Age of Onset   [facility] Cancer Mother     Hypertension Mother     Cancer Sister     Cancer Maternal Aunt     Arthritis-rheumatoid Maternal Grandmother     Stroke Maternal Grandmother     Diabetes Maternal Grandfather        Social History:  Social History     Tobacco Use    Smoking status: Never Smoker    Smokeless tobacco: Never Used   Substance Use Topics    Alcohol use: No    Drug use: No       Allergies: Allergies   Allergen Reactions    Aspirin Unknown (comments)     States that her mother said she was allergic, had as her child    Lemon Unable to Obtain    Pcn [Penicillins] Unknown (comments)     Pt states her mother said she was allergic, had as a child         Review of Systems   Review of Systems   Constitutional: Positive for activity change, appetite change, fatigue and fever. Negative for chills. HENT: Positive for congestion. Negative for sore throat. Respiratory: Positive for cough. Negative for shortness of breath and wheezing. Cardiovascular: Negative for chest pain. Gastrointestinal: Negative for abdominal pain, nausea and vomiting. Musculoskeletal: Positive for back pain and myalgias. Allergic/Immunologic: Negative for immunocompromised state. Neurological: Negative for speech difficulty and weakness. All other systems reviewed and are negative. Physical Exam     Vitals:    04/19/21 1209 04/19/21 1310 04/19/21 1400 04/19/21 1415   BP: (!) 126/53 134/74 122/75    Pulse: (!) 123 (!) 107  (!) 108   Resp: 18      Temp: (!) 101.8 °F (38.8 °C)   99.8 °F (37.7 °C)   SpO2: 100% 92%     Weight: 115.7 kg (255 lb)      Height: 5' 8\" (1.727 m)        Physical Exam  Vitals signs and nursing note reviewed. Constitutional:       General: She is not in acute distress. Appearance: She is well-developed. HENT:      Head: Normocephalic and atraumatic. Right Ear: Tympanic membrane normal.      Left Ear: Tympanic membrane normal.      Nose: Congestion present. Mouth/Throat:      Mouth: Mucous membranes are moist.      Pharynx: Oropharynx is clear.  No oropharyngeal exudate or posterior oropharyngeal erythema. Eyes:      Conjunctiva/sclera: Conjunctivae normal.   Cardiovascular:      Rate and Rhythm: Normal rate and regular rhythm. Heart sounds: Normal heart sounds. Pulmonary:      Effort: Pulmonary effort is normal. No respiratory distress. Breath sounds: Normal breath sounds. No wheezing or rales. Abdominal:      General: Bowel sounds are normal. There is no distension. Palpations: Abdomen is soft. Tenderness: There is no abdominal tenderness. There is no guarding or rebound. Skin:     General: Skin is warm and dry. Neurological:      Mental Status: She is alert and oriented to person, place, and time. Psychiatric:         Behavior: Behavior normal.         Thought Content: Thought content normal.         Judgment: Judgment normal.           Diagnostic Study Results     Labs -     Recent Results (from the past 12 hour(s))   COVID-19 WITH INFLUENZA A/B    Collection Time: 04/19/21 12:40 PM   Result Value Ref Range    SARS-CoV-2 Detected (AA) NOTD      Influenza A by PCR Not detected NOTD      Influenza B by PCR Not detected NOTD     CBC WITH AUTOMATED DIFF    Collection Time: 04/19/21 12:55 PM   Result Value Ref Range    WBC 4.8 3.6 - 11.0 K/uL    RBC 4.38 3.80 - 5.20 M/uL    HGB 13.1 11.5 - 16.0 g/dL    HCT 40.1 35.0 - 47.0 %    MCV 91.6 80.0 - 99.0 FL    MCH 29.9 26.0 - 34.0 PG    MCHC 32.7 30.0 - 36.5 g/dL    RDW 14.0 11.5 - 14.5 %    PLATELET 558 430 - 612 K/uL    MPV 9.8 8.9 - 12.9 FL    NRBC 0.0 0  WBC    ABSOLUTE NRBC 0.00 0.00 - 0.01 K/uL    NEUTROPHILS 74 32 - 75 %    LYMPHOCYTES 20 12 - 49 %    MONOCYTES 6 5 - 13 %    EOSINOPHILS 0 0 - 7 %    BASOPHILS 0 0 - 1 %    IMMATURE GRANULOCYTES 0 0.0 - 0.5 %    ABS. NEUTROPHILS 3.6 1.8 - 8.0 K/UL    ABS. LYMPHOCYTES 0.9 0.8 - 3.5 K/UL    ABS. MONOCYTES 0.3 0.0 - 1.0 K/UL    ABS. EOSINOPHILS 0.0 0.0 - 0.4 K/UL    ABS. BASOPHILS 0.0 0.0 - 0.1 K/UL    ABS. IMM.  GRANS. 0.0 0.00 - 0.04 K/UL    DF AUTOMATED     METABOLIC PANEL, COMPREHENSIVE    Collection Time: 04/19/21 12:55 PM   Result Value Ref Range    Sodium 135 (L) 136 - 145 mmol/L    Potassium 3.4 (L) 3.5 - 5.1 mmol/L    Chloride 98 97 - 108 mmol/L    CO2 30 21 - 32 mmol/L    Anion gap 7 5 - 15 mmol/L    Glucose 97 65 - 100 mg/dL    BUN 10 6 - 20 MG/DL    Creatinine 1.22 (H) 0.55 - 1.02 MG/DL    BUN/Creatinine ratio 8 (L) 12 - 20      GFR est AA 58 (L) >60 ml/min/1.73m2    GFR est non-AA 48 (L) >60 ml/min/1.73m2    Calcium 8.8 8.5 - 10.1 MG/DL    Bilirubin, total 0.3 0.2 - 1.0 MG/DL    ALT (SGPT) 22 12 - 78 U/L    AST (SGOT) 25 15 - 37 U/L    Alk. phosphatase 74 45 - 117 U/L    Protein, total 8.4 (H) 6.4 - 8.2 g/dL    Albumin 3.2 (L) 3.5 - 5.0 g/dL    Globulin 5.2 (H) 2.0 - 4.0 g/dL    A-G Ratio 0.6 (L) 1.1 - 2.2     URINALYSIS W/ REFLEX CULTURE    Collection Time: 04/19/21  1:58 PM    Specimen: Urine   Result Value Ref Range    Color YELLOW/STRAW      Appearance CLOUDY (A) CLEAR      Specific gravity 1.015 1.003 - 1.030      pH (UA) 6.0 5.0 - 8.0      Protein 30 (A) NEG mg/dL    Glucose Negative NEG mg/dL    Ketone 15 (A) NEG mg/dL    Bilirubin Negative NEG      Blood MODERATE (A) NEG      Urobilinogen 1.0 0.2 - 1.0 EU/dL    Nitrites Negative NEG      Leukocyte Esterase MODERATE (A) NEG      WBC 10-20 0 - 4 /hpf    RBC 10-20 0 - 5 /hpf    Epithelial cells MODERATE (A) FEW /lpf    Bacteria 1+ (A) NEG /hpf    UA:UC IF INDICATED URINE CULTURE ORDERED (A) CNI         Radiologic Studies -   XR CHEST PORT   Final Result   Left basilar airspace disease may represent atelectasis or early   pneumonia. CT Results  (Last 48 hours)    None        CXR Results  (Last 48 hours)               04/19/21 1258  XR CHEST PORT Final result    Impression:  Left basilar airspace disease may represent atelectasis or early   pneumonia.            Narrative:  INDICATION: Cough       COMPARISON: 10/20/2011       FINDINGS: AP portable imaging of the chest performed at 12:46 PM demonstrates a stable cardiomediastinal silhouette. There is new mild left basilar airspace   disease. No pleural effusion is evident. No significant osseous abnormalities   are seen. Medical Decision Making   I am the first provider for this patient. I reviewed the vital signs, available nursing notes, past medical history, past surgical history, family history and social history. Vital Signs-Reviewed the patient's vital signs. Records Reviewed: Nursing Notes and Old Medical Records    Provider Notes (Medical Decision Making):   Patient presenting with generalized fatigue, cough, fever and nasal congestion. DDx: PNA, COVID-19, URI, anemia, electrolyte anomoly (hypo or hyperkalemia, hypomagnesemia), hypothyroid, dehydration, CA, ACS. Will obtain UA, labwork, CXR and COVID/flu swab      ED Course as of Apr 19 1659   Mon Apr 19, 2021   1433 Discussed resulted labs with pt. She is already taking Bactrim for \"sinus infection\" so will allow her to continue taking until C&S come back for urine to see if she needs a different abx as she is asymptomatic    [AH]      ED Course User Index  [AH] Amber Andrea PA-C          Disposition:  Discharged    DISCHARGE NOTE:   3:07p      Care plan outlined and precautions discussed. Patient has no new complaints, changes, or physical findings. Results of labs and imaging were reviewed with the patient. All medications were reviewed with the patient; will d/c home. All of pt's questions and concerns were addressed. Patient was instructed and agrees to follow up with PCP prn, as well as to return to the ED upon further deterioration. Patient is ready to go home.     Follow-up Information     Follow up With Specialties Details Why Rosalind Hurt MD Internal Medicine  As needed [address] [facility]  [phone]            Discharge Medication List as of 4/19/2021  3:07 PM          Procedures:  Procedures    Please note that this dictation was completed with Dragon, computer voice recognition software. Quite often unanticipated grammatical, syntax, homophones, and other interpretive errors are inadvertently transcribed by the computer software. Please disregard these errors. Additionally, please excuse any errors that have escaped final proofreading. Diagnosis     Clinical Impression:   1. COVID-19    2. Community acquired pneumonia of left lower lobe of lung    3.  Acute cystitis with hematuria

## 2021-04-19 NOTE — ED TRIAGE NOTES
Patient presents to the ED with c/o fatigue, cough, and fever x3 days. Pt reports feeling dehydrated and dizzy. Pt reports no n/v.d. Pt reports drinking Gatorade and water. Pt reports decreased food intake.

## 2021-04-19 NOTE — ED NOTES
Primary nurse Eliverto Cowden) and mid-level Kristen Mckeon) notified that patient is covid positive.

## 2021-04-19 NOTE — ED NOTES
Pt arrives to the ED AAOX4 with a c/c of cough and fever onset three days ago. Pt reports feeling fatigued, dehydrated and dizzy. Pt states two weeks ago she took two COVID test because she was working with someone who tested positive, but she tested negative. Pt is noted in stable condition, now in ED room with side rail up, bed to lowest position and call light within reach. Will continue to monitor and wait for ED provider evaluation. Emergency Department Nursing Plan of Care       The Nursing Plan of Care is developed from the Nursing assessment and Emergency Department Attending provider initial evaluation. The plan of care may be reviewed in the ED Provider note.     The Plan of Care was developed with the following considerations:   Patient / Family readiness to learn indicated by:verbalized understanding  Persons(s) to be included in education: patient  Barriers to Learning/Limitations:No    Signed     Russ Mckeon    4/19/2021   12:44 PM

## 2021-04-20 ENCOUNTER — PATIENT OUTREACH (OUTPATIENT)
Dept: CASE MANAGEMENT | Age: 46
End: 2021-04-20

## 2021-04-20 LAB
BACTERIA SPEC CULT: NORMAL
SERVICE CMNT-IMP: NORMAL

## 2021-04-20 NOTE — PROGRESS NOTES
Patient contacted regarding [id number] diagnosis\". Discussed COVID-19 related testing which was available at this time. Test results were positive. Patient informed of results, if available? yes     LPN Care Coordinator contacted the patient by telephone to perform post discharge assessment. Call within 2 business days of discharge: Yes Verified name and  with patient as identifiers. Provided introduction to self, and explanation of the CTN/ACM role, and reason for call due to risk factors for infection and/or exposure to COVID-19. Symptoms reviewed with patient who verbalized the following symptoms: no new symptoms and no worsening symptoms      Due to no new or worsening symptoms encounter was not routed to provider for escalation. Discussed follow-up appointments. If no appointment was previously scheduled, appointment scheduling offered:  yes   [facility] follow up appointment(s): No future appointments. Non-[facility] follow up appointment(s): Patient to call and schedule appt with Dr. Robert Elliott:   Does patient have an Advance Directive:  decision maker updated. Patient has following risk factors of: no known risk factors. LPN CC reviewed discharge instructions, medical action plan and red flags such as increased shortness of breath, increasing fever and signs of decompensation with patient who verbalized understanding. Discussed exposure protocols and quarantine with CDC Guidelines What to do if you are sick with coronavirus disease .  Patient was given an opportunity for questions and concerns. The patient agrees to contact the [facility] exposure line [phone], [facility] department [address]  ([phone] and PCP office for questions related to their healthcare. CTN provided contact information for future needs.     Reviewed and educated patient on any new and changed medications related to discharge diagnosis     Was patient discharged with a pulse oximeter? no     Patient/family/caregiver given information for [employer] and agrees to enroll no      Plan for follow-up call in 5-7 days based on severity of symptoms and risk factors.

## 2021-05-04 ENCOUNTER — PATIENT OUTREACH (OUTPATIENT)
Dept: CASE MANAGEMENT | Age: 46
End: 2021-05-04

## 2021-05-04 NOTE — PROGRESS NOTES
Patient resolved from [address] Transitions episode on 5/4/21. Discussed COVID-19 related testing which was available at this time. Test results were positive. Patient informed of results, if available? yes     Patient/family has been provided the following resources and education related to COVID-19:                         Signs, symptoms and red flags related to COVID-19            [facility] exposure and quarantine guidelines            [facility] exposure contact - [phone]            Contact for their local Department of Health                 Patient currently reports that the following symptoms have improved:  no new symptoms and no worsening symptoms. Patient has had another covid test completed which was negative     No further outreach scheduled with this CTN/ACM/LPN/HC/ MA. Episode of Care resolved. Patient has this CTN/ACM/LPN/HC/MA contact information if future needs arise.

## 2022-06-01 ENCOUNTER — TRANSCRIBE ORDER (OUTPATIENT)
Dept: SCHEDULING | Age: 47
End: 2022-06-01

## 2022-06-01 DIAGNOSIS — R13.10 DYSPHAGIA: Primary | ICD-10-CM

## 2022-06-14 ENCOUNTER — HOSPITAL ENCOUNTER (OUTPATIENT)
Dept: GENERAL RADIOLOGY | Age: 47
Discharge: HOME | End: 2022-06-14
Attending: INTERNAL MEDICINE
Payer: COMMERCIAL

## 2022-06-14 DIAGNOSIS — R13.10 DYSPHAGIA: ICD-10-CM

## 2022-06-14 PROCEDURE — 74220 X-RAY XM ESOPHAGUS 1CNTRST: CPT

## 2023-04-24 ENCOUNTER — APPOINTMENT (OUTPATIENT)
Dept: GENERAL RADIOLOGY | Age: 48
End: 2023-04-24
Attending: STUDENT IN AN ORGANIZED HEALTH CARE EDUCATION/TRAINING PROGRAM
Payer: COMMERCIAL

## 2023-04-24 ENCOUNTER — HOSPITAL ENCOUNTER (EMERGENCY)
Age: 48
Discharge: HOME | End: 2023-04-24
Attending: STUDENT IN AN ORGANIZED HEALTH CARE EDUCATION/TRAINING PROGRAM
Payer: COMMERCIAL

## 2023-04-24 VITALS
BODY MASS INDEX: 39.4 KG/M2 | DIASTOLIC BLOOD PRESSURE: 88 MMHG | SYSTOLIC BLOOD PRESSURE: 125 MMHG | RESPIRATION RATE: 18 BRPM | TEMPERATURE: 98.3 F | WEIGHT: 260 LBS | OXYGEN SATURATION: 99 % | HEART RATE: 75 BPM | HEIGHT: 68 IN

## 2023-04-24 DIAGNOSIS — M54.50 LUMBAR BACK PAIN: Primary | ICD-10-CM

## 2023-04-24 LAB — HCG UR QL: NEGATIVE

## 2023-04-24 PROCEDURE — 73502 X-RAY EXAM HIP UNI 2-3 VIEWS: CPT

## 2023-04-24 PROCEDURE — 81025 URINE PREGNANCY TEST: CPT

## 2023-04-24 PROCEDURE — 72100 X-RAY EXAM L-S SPINE 2/3 VWS: CPT

## 2023-04-24 PROCEDURE — 99283 EMERGENCY DEPT VISIT LOW MDM: CPT

## 2023-04-24 RX ORDER — CYCLOBENZAPRINE HCL 10 MG
10 TABLET ORAL
Qty: 15 TABLET | Refills: 0 | Status: SHIPPED | OUTPATIENT
Start: 2023-04-24 | End: 2023-04-29

## 2023-04-24 RX ORDER — PREDNISONE 50 MG/1
50 TABLET ORAL DAILY
Qty: 3 TABLET | Refills: 0 | Status: SHIPPED | OUTPATIENT
Start: 2023-04-24 | End: 2023-04-27

## 2023-04-24 NOTE — ED TRIAGE NOTES
Pt arrives to ER with c/o severe lower back and right hip pain since last week. Pt reports she was on her cycle last week and thought the pain was related but the pain is worsening. Denies injury or trauma.

## 2024-09-27 NOTE — ED NOTES
Medication:     amphetamine-dextroamphetamine (ADDERALL) 15 MG tablet     Last office visit date: 01/15/2024  Medication Refill Protocol Failed.    FORWARD TO PROVIDER - Refill requests for these medications must ALWAYS be forwarded to the ordering provider, even if all criteria are met    PDMP has been reviewed in last 24 hours    Seen by prescribing provider or same department within the last 6 months or has a future appt in 6 months - IF FAILED PLEASE LOOK AT CHART REVIEW FOR LAST VISIT AND PROCEED ACCORDINGLY    Urine/serum drug screen on file in the appropriate time frame    Active/up-to-date controlled substances agreement/consent on file     Thank you for calling [facility], your medication(s) have been sent to your provider for further review. Please contact your provider if you have not received an update within 48 hours regarding your medication refill. Please do not respond to this message.       Patient (s)  given copy of dc instructions and 1 script(s). Patient (s)  verbalized understanding of instructions and script (s). Patient given a current medication reconciliation form and verbalized understanding of their medications. Patient (s) verbalized understanding of the importance of discussing medications with  his or her physician or clinic they will be following up with. Patient alert and oriented and in no acute distress. Patient discharged home ambulatory with self.

## 2025-02-25 ENCOUNTER — APPOINTMENT (OUTPATIENT)
Facility: HOSPITAL | Age: 50
End: 2025-02-25
Payer: COMMERCIAL

## 2025-02-25 ENCOUNTER — HOSPITAL ENCOUNTER (EMERGENCY)
Facility: HOSPITAL | Age: 50
Discharge: HOME | End: 2025-02-25
Attending: EMERGENCY MEDICINE
Payer: COMMERCIAL

## 2025-02-25 VITALS
HEIGHT: 67 IN | BODY MASS INDEX: 45.52 KG/M2 | RESPIRATION RATE: 17 BRPM | WEIGHT: 290 LBS | OXYGEN SATURATION: 100 % | HEART RATE: 77 BPM | DIASTOLIC BLOOD PRESSURE: 88 MMHG | TEMPERATURE: 98.2 F | SYSTOLIC BLOOD PRESSURE: 171 MMHG

## 2025-02-25 DIAGNOSIS — O20.0 THREATENED MISCARRIAGE IN EARLY PREGNANCY: Primary | ICD-10-CM

## 2025-02-25 LAB
ANION GAP SERPL CALC-SCNC: 10 MMOL/L (ref 2–12)
APPEARANCE UR: ABNORMAL
BACTERIA URNS QL MICRO: ABNORMAL /HPF
BASOPHILS # BLD: 0.08 K/UL (ref 0–0.1)
BASOPHILS NFR BLD: 1.1 % (ref 0–1)
BILIRUB UR QL: NEGATIVE
BUN SERPL-MCNC: 9 MG/DL (ref 6–20)
BUN/CREAT SERPL: 14 (ref 12–20)
CALCIUM SERPL-MCNC: 9.6 MG/DL (ref 8.6–10)
CHLORIDE SERPL-SCNC: 103 MMOL/L (ref 98–107)
CO2 SERPL-SCNC: 26 MMOL/L (ref 22–29)
COLOR UR: ABNORMAL
CREAT SERPL-MCNC: 0.65 MG/DL (ref 0.5–0.9)
DIFFERENTIAL METHOD BLD: ABNORMAL
EOSINOPHIL # BLD: 0.46 K/UL (ref 0–0.4)
EOSINOPHIL NFR BLD: 6 % (ref 0–7)
EPITH CASTS URNS QL MICRO: ABNORMAL /LPF
ERYTHROCYTE [DISTWIDTH] IN BLOOD BY AUTOMATED COUNT: 14.4 % (ref 11.5–14.5)
GLUCOSE SERPL-MCNC: 110 MG/DL (ref 65–100)
GLUCOSE UR STRIP.AUTO-MCNC: NEGATIVE MG/DL
HCG SERPL-ACNC: 210 MIU/ML
HCT VFR BLD AUTO: 35.6 % (ref 35–47)
HGB BLD-MCNC: 11.9 G/DL (ref 11.5–16)
HGB UR QL STRIP: ABNORMAL
IMM GRANULOCYTES # BLD AUTO: 0.01 K/UL (ref 0–0.04)
IMM GRANULOCYTES NFR BLD AUTO: 0.1 % (ref 0–0.5)
KETONES UR QL STRIP.AUTO: NEGATIVE MG/DL
LEUKOCYTE ESTERASE UR QL STRIP.AUTO: NEGATIVE
LYMPHOCYTES # BLD: 2.44 K/UL (ref 0.8–3.5)
LYMPHOCYTES NFR BLD: 32.1 % (ref 12–49)
MCH RBC QN AUTO: 30.8 PG (ref 26–34)
MCHC RBC AUTO-ENTMCNC: 33.4 G/DL (ref 30–36.5)
MCV RBC AUTO: 92.2 FL (ref 80–99)
MONOCYTES # BLD: 0.77 K/UL (ref 0–1)
MONOCYTES NFR BLD: 10.1 % (ref 5–13)
MUCOUS THREADS URNS QL MICRO: ABNORMAL /LPF
NEUTS SEG # BLD: 3.85 K/UL (ref 1.8–8)
NEUTS SEG NFR BLD: 50.6 % (ref 32–75)
NITRITE UR QL STRIP.AUTO: NEGATIVE
NRBC # BLD: 0.02 K/UL (ref 0–0.01)
NRBC BLD-RTO: 0.3 PER 100 WBC
PH UR STRIP: 6 (ref 5–8)
PLATELET # BLD AUTO: 231 K/UL (ref 150–400)
PMV BLD AUTO: 9.9 FL (ref 8.9–12.9)
POTASSIUM SERPL-SCNC: 3.7 MMOL/L (ref 3.5–5.1)
PROT UR STRIP-MCNC: NEGATIVE MG/DL
RBC # BLD AUTO: 3.86 M/UL (ref 3.8–5.2)
RBC #/AREA URNS HPF: ABNORMAL /HPF
SODIUM SERPL-SCNC: 139 MMOL/L (ref 136–145)
SP GR UR REFRACTOMETRY: 1.02 (ref 1–1.03)
SPECIMEN HOLD: NORMAL
UROBILINOGEN UR QL STRIP.AUTO: 1 EU/DL (ref 0.2–1)
WBC # BLD AUTO: 7.6 K/UL (ref 3.6–11)
WBC URNS QL MICRO: ABNORMAL /HPF (ref 0–4)

## 2025-02-25 PROCEDURE — 99284 EMERGENCY DEPT VISIT MOD MDM: CPT

## 2025-02-25 PROCEDURE — 80048 BASIC METABOLIC PNL TOTAL CA: CPT

## 2025-02-25 PROCEDURE — 76817 TRANSVAGINAL US OBSTETRIC: CPT

## 2025-02-25 PROCEDURE — 76801 OB US < 14 WKS SINGLE FETUS: CPT

## 2025-02-25 PROCEDURE — 85025 COMPLETE CBC W/AUTO DIFF WBC: CPT

## 2025-02-25 PROCEDURE — 36415 COLL VENOUS BLD VENIPUNCTURE: CPT

## 2025-02-25 PROCEDURE — 81001 URINALYSIS AUTO W/SCOPE: CPT

## 2025-02-25 PROCEDURE — 84702 CHORIONIC GONADOTROPIN TEST: CPT

## 2025-02-25 ASSESSMENT — LIFESTYLE VARIABLES
HOW OFTEN DO YOU HAVE A DRINK CONTAINING ALCOHOL: NEVER
HOW MANY STANDARD DRINKS CONTAINING ALCOHOL DO YOU HAVE ON A TYPICAL DAY: PATIENT DOES NOT DRINK

## 2025-02-25 ASSESSMENT — PAIN - FUNCTIONAL ASSESSMENT: PAIN_FUNCTIONAL_ASSESSMENT: NONE - DENIES PAIN

## 2025-02-26 NOTE — ED TRIAGE NOTES
Pt in ED with c/o vaginal bleeding that started this morning when she wiped and then got worse tonight. Pt is 7 weeks pregnant.       OB is [facility] and was seen last week.

## 2025-02-26 NOTE — DISCHARGE INSTRUCTIONS
You were seen today in the emergency department for vaginal bleeding in early pregnancy.  Ultrasound imaging did show evidence of a gestational sac within your uterus consistent with a 5-week gestation.  Your pregnancy hormone measured at 210 today which is lower than expected at a 5-week gestation.  Given these findings you have what is called a threatened miscarriage.  It is important for you to follow-up with OB/GYN to have your pregnancy hormone rechecked to see how it is trending.  You can take Tylenol as needed for pain.  Return to the emergency department for any new or worsening symptoms such as severe pain or bleeding.

## 2025-02-26 NOTE — ED PROVIDER NOTES
[place] EM[facility]Y DEPARTMENT  EMERGENCY DEPARTMENT ENCOUNTER      Pt Name: Yulisa Kidd  MRN: [id number]  Birthdate 1975  Date of evaluation: 2/25/2025  Provider: Amalia Anderson PA-C    CHIEF COMPLAINT       Chief Complaint   Patient presents with    Bleeding During Pregnancy         HISTORY OF PRESENT ILLNESS   (Location/Symptom, Timing/Onset, Context/Setting, Quality, Duration, Modifying Factors, Severity)  Note limiting factors.   Yulisa Kidd is a 49 y.o. female with history of  has a past medical history of Asthma and Asthma. who presents from home to [facility] ED with cc of vaginal bleeding in early pregnancy.  Last menstrual period was January 6, 2025.  She reports this morning when wiping she noticed a small amount of pink discoloration on the toilet paper.  She notes she had sexual intercourse last night.  She reports this has happened each time when wiping today.  Her OB is with [facility].  She called and they advised her to monitor symptoms closely and go to the ER if she has heavier bleeding or clotting or severe pain.  She saw them in the office last week.  She states she did not have an ultrasound at that time.  She has not had a confirmed IUP on ultrasound.  She denies pelvic pain.  She reports she began having heavier bleeding tonight when she laid down she bled through her pants and underwear.  She reports the blood at that time was bright red.  She is G5, P3.            PCP: Ibrahima Warner MD    There are no other complaints, changes or physical findings at this time.       The history is provided by the patient.         Review of External Medical Records:     Nursing Notes were reviewed.    REVIEW OF SYSTEMS    (2-9 systems for level 4, 10 or more for level 5)     Review of Systems   Genitourinary:  Positive for vaginal bleeding.       Except as noted above the remainder of the review of systems was reviewed and negative.       PAST MEDICAL HISTORY     Past Medical